# Patient Record
Sex: FEMALE | Race: WHITE | NOT HISPANIC OR LATINO | Employment: UNEMPLOYED | URBAN - METROPOLITAN AREA
[De-identification: names, ages, dates, MRNs, and addresses within clinical notes are randomized per-mention and may not be internally consistent; named-entity substitution may affect disease eponyms.]

---

## 2024-05-01 ENCOUNTER — HOSPITAL ENCOUNTER (OUTPATIENT)
Dept: RADIOLOGY | Facility: EXTERNAL LOCATION | Age: 64
Discharge: HOME | End: 2024-05-01

## 2024-05-01 ENCOUNTER — OFFICE VISIT (OUTPATIENT)
Dept: PRIMARY CARE | Facility: CLINIC | Age: 64
End: 2024-05-01
Payer: COMMERCIAL

## 2024-05-01 ENCOUNTER — LAB (OUTPATIENT)
Dept: LAB | Facility: LAB | Age: 64
End: 2024-05-01
Payer: COMMERCIAL

## 2024-05-01 VITALS
HEIGHT: 65 IN | BODY MASS INDEX: 31.69 KG/M2 | OXYGEN SATURATION: 98 % | DIASTOLIC BLOOD PRESSURE: 87 MMHG | HEART RATE: 73 BPM | WEIGHT: 190.2 LBS | TEMPERATURE: 97.2 F | SYSTOLIC BLOOD PRESSURE: 118 MMHG

## 2024-05-01 DIAGNOSIS — M54.6 CHRONIC LEFT-SIDED THORACIC BACK PAIN: ICD-10-CM

## 2024-05-01 DIAGNOSIS — L40.50 PSORIATIC ARTHRITIS (MULTI): ICD-10-CM

## 2024-05-01 DIAGNOSIS — D64.9 ANEMIA, UNSPECIFIED TYPE: ICD-10-CM

## 2024-05-01 DIAGNOSIS — M25.561 CHRONIC PAIN OF RIGHT KNEE: ICD-10-CM

## 2024-05-01 DIAGNOSIS — L40.50 PSORIATIC ARTHROPATHY (MULTI): ICD-10-CM

## 2024-05-01 DIAGNOSIS — Z11.59 ENCOUNTER FOR HEPATITIS C SCREENING TEST FOR LOW RISK PATIENT: ICD-10-CM

## 2024-05-01 DIAGNOSIS — B35.1 FUNGAL TOENAIL INFECTION: ICD-10-CM

## 2024-05-01 DIAGNOSIS — Z00.00 ANNUAL PHYSICAL EXAM: ICD-10-CM

## 2024-05-01 DIAGNOSIS — L65.9 HAIR LOSS: Primary | ICD-10-CM

## 2024-05-01 DIAGNOSIS — G89.29 CHRONIC PAIN OF RIGHT KNEE: ICD-10-CM

## 2024-05-01 DIAGNOSIS — M25.551 PAIN OF RIGHT HIP: ICD-10-CM

## 2024-05-01 DIAGNOSIS — Z12.31 BREAST CANCER SCREENING BY MAMMOGRAM: ICD-10-CM

## 2024-05-01 DIAGNOSIS — G89.29 CHRONIC LEFT-SIDED THORACIC BACK PAIN: ICD-10-CM

## 2024-05-01 DIAGNOSIS — L65.9 HAIR LOSS: ICD-10-CM

## 2024-05-01 DIAGNOSIS — Z12.11 COLON CANCER SCREENING: ICD-10-CM

## 2024-05-01 LAB
25(OH)D3 SERPL-MCNC: 33 NG/ML (ref 30–100)
ALBUMIN SERPL BCP-MCNC: 4.5 G/DL (ref 3.4–5)
ALP SERPL-CCNC: 97 U/L (ref 33–136)
ALT SERPL W P-5'-P-CCNC: 16 U/L (ref 7–45)
ANION GAP SERPL CALC-SCNC: 14 MMOL/L (ref 10–20)
AST SERPL W P-5'-P-CCNC: 18 U/L (ref 9–39)
BASOPHILS # BLD AUTO: 0.05 X10*3/UL (ref 0–0.1)
BASOPHILS NFR BLD AUTO: 0.5 %
BILIRUB SERPL-MCNC: 0.6 MG/DL (ref 0–1.2)
BUN SERPL-MCNC: 12 MG/DL (ref 6–23)
CALCIUM SERPL-MCNC: 9.5 MG/DL (ref 8.6–10.6)
CCP IGG SERPL-ACNC: <1 U/ML
CHLORIDE SERPL-SCNC: 104 MMOL/L (ref 98–107)
CHOLEST SERPL-MCNC: 214 MG/DL (ref 0–199)
CHOLESTEROL/HDL RATIO: 3.8
CO2 SERPL-SCNC: 28 MMOL/L (ref 21–32)
CREAT SERPL-MCNC: 0.72 MG/DL (ref 0.5–1.05)
CRP SERPL-MCNC: 0.86 MG/DL
EGFRCR SERPLBLD CKD-EPI 2021: >90 ML/MIN/1.73M*2
EOSINOPHIL # BLD AUTO: 0.2 X10*3/UL (ref 0–0.7)
EOSINOPHIL NFR BLD AUTO: 2.2 %
ERYTHROCYTE [DISTWIDTH] IN BLOOD BY AUTOMATED COUNT: 12.8 % (ref 11.5–14.5)
ERYTHROCYTE [SEDIMENTATION RATE] IN BLOOD BY WESTERGREN METHOD: 22 MM/H (ref 0–30)
GLUCOSE SERPL-MCNC: 96 MG/DL (ref 74–99)
HCT VFR BLD AUTO: 42.2 % (ref 36–46)
HCV AB SER QL: NONREACTIVE
HDLC SERPL-MCNC: 56.3 MG/DL
HGB BLD-MCNC: 13.3 G/DL (ref 12–16)
IMM GRANULOCYTES # BLD AUTO: 0.02 X10*3/UL (ref 0–0.7)
IMM GRANULOCYTES NFR BLD AUTO: 0.2 % (ref 0–0.9)
LDLC SERPL CALC-MCNC: 133 MG/DL
LYMPHOCYTES # BLD AUTO: 3.22 X10*3/UL (ref 1.2–4.8)
LYMPHOCYTES NFR BLD AUTO: 35.2 %
MCH RBC QN AUTO: 28.5 PG (ref 26–34)
MCHC RBC AUTO-ENTMCNC: 31.5 G/DL (ref 32–36)
MCV RBC AUTO: 91 FL (ref 80–100)
MONOCYTES # BLD AUTO: 0.51 X10*3/UL (ref 0.1–1)
MONOCYTES NFR BLD AUTO: 5.6 %
NEUTROPHILS # BLD AUTO: 5.14 X10*3/UL (ref 1.2–7.7)
NEUTROPHILS NFR BLD AUTO: 56.3 %
NON HDL CHOLESTEROL: 158 MG/DL (ref 0–149)
NRBC BLD-RTO: 0 /100 WBCS (ref 0–0)
PLATELET # BLD AUTO: 417 X10*3/UL (ref 150–450)
POTASSIUM SERPL-SCNC: 4.6 MMOL/L (ref 3.5–5.3)
PROT SERPL-MCNC: 7.3 G/DL (ref 6.4–8.2)
RBC # BLD AUTO: 4.66 X10*6/UL (ref 4–5.2)
RHEUMATOID FACT SER NEPH-ACNC: <10 IU/ML (ref 0–15)
SODIUM SERPL-SCNC: 141 MMOL/L (ref 136–145)
TRIGL SERPL-MCNC: 123 MG/DL (ref 0–149)
TSH SERPL-ACNC: 3.47 MIU/L (ref 0.44–3.98)
VLDL: 25 MG/DL (ref 0–40)
WBC # BLD AUTO: 9.1 X10*3/UL (ref 4.4–11.3)

## 2024-05-01 PROCEDURE — 84443 ASSAY THYROID STIM HORMONE: CPT

## 2024-05-01 PROCEDURE — 36415 COLL VENOUS BLD VENIPUNCTURE: CPT

## 2024-05-01 PROCEDURE — 80061 LIPID PANEL: CPT

## 2024-05-01 PROCEDURE — 80053 COMPREHEN METABOLIC PANEL: CPT

## 2024-05-01 PROCEDURE — 86431 RHEUMATOID FACTOR QUANT: CPT

## 2024-05-01 PROCEDURE — 82306 VITAMIN D 25 HYDROXY: CPT

## 2024-05-01 PROCEDURE — 86803 HEPATITIS C AB TEST: CPT

## 2024-05-01 PROCEDURE — 99386 PREV VISIT NEW AGE 40-64: CPT | Performed by: NURSE PRACTITIONER

## 2024-05-01 PROCEDURE — 85025 COMPLETE CBC W/AUTO DIFF WBC: CPT

## 2024-05-01 PROCEDURE — 85652 RBC SED RATE AUTOMATED: CPT

## 2024-05-01 PROCEDURE — 86140 C-REACTIVE PROTEIN: CPT

## 2024-05-01 PROCEDURE — 86200 CCP ANTIBODY: CPT

## 2024-05-01 RX ORDER — CICLOPIROX 80 MG/ML
SOLUTION TOPICAL
COMMUNITY
Start: 2023-11-06 | End: 2024-05-01 | Stop reason: SDUPTHER

## 2024-05-01 RX ORDER — CICLOPIROX 80 MG/ML
SOLUTION TOPICAL
Qty: 6.6 ML | Refills: 1 | Status: SHIPPED | OUTPATIENT
Start: 2024-05-01

## 2024-05-01 RX ORDER — AMMONIUM LACTATE 12 G/100G
CREAM TOPICAL
COMMUNITY
Start: 2023-05-15 | End: 2024-05-01 | Stop reason: ALTCHOICE

## 2024-05-01 RX ORDER — NAPROXEN 250 MG/1
250 TABLET ORAL 2 TIMES DAILY PRN
COMMUNITY

## 2024-05-01 ASSESSMENT — ENCOUNTER SYMPTOMS
DYSPHORIC MOOD: 1
BACK PAIN: 1
ARTHRALGIAS: 1

## 2024-05-01 ASSESSMENT — PATIENT HEALTH QUESTIONNAIRE - PHQ9
SUM OF ALL RESPONSES TO PHQ9 QUESTIONS 1 AND 2: 0
1. LITTLE INTEREST OR PLEASURE IN DOING THINGS: NOT AT ALL
2. FEELING DOWN, DEPRESSED OR HOPELESS: NOT AT ALL

## 2024-05-01 NOTE — PROGRESS NOTES
"Yamilet Vera female   1960 63 y.o.   [id number]    Chief Complaint  New Patient Visit (C/O right hip pain X 6 months , swelling in right knee(normal for her she states) left shoulder pain only when leaning against \"something\").    History Of Present Illness  Yamilet Vera is a 63 y.o. female presents today to establish care.   No recent PCP.      Acute concerns today:   Has pain in back and tenderness when leaning back.  Present x 2-3 years.   States was evaluated by dermatologist in past and a punch bx was obtained in the general area of pain.  States she has not noted any growth, mass, or skin lesion in the area currently.        Pain is 3-4/10 only when leaning back on it.   Does not usually feel it when laying flat.  Only when leaning on that particular spot.          R hip and R knee pain- present x years.   Would like to have xray for further evaluation.       Chronic conditions reviewed:     Psoriatic arthritis   Followed w rheumatology in past.   Has not seen in many years.  Is skeptical that any intervention/medication will help with chronic pain but open to referral to Rheumatology.     Review of Systems  Review of Systems   Musculoskeletal:  Positive for arthralgias (r hip, r knee) and back pain.   Psychiatric/Behavioral:  Positive for dysphoric mood.        Diet: fairly well balanced (likes salty snacks)   Exercise: walks a mile daily   Dental: twice per year   Ophtho: Glasses, Yearly follow up.  Start of cataract      Screenings:   Pap- due   Mammo- ordered today   Colonoscopy- cologuard ordered   Immunizations:   Flu- uptd   Tdap-2019   Covid- uptd   Shingles-uptd     Past Medical History  She has a past medical history of Cancer (Multi) (2004) and Psoriatic arthritis (Multi).    Surgical History  She has no past surgical history on file.    Family History  Family History   Problem Relation Name Age of Onset    Heart disease Mother      Eczema Mother      Heart failure Mother      Skin cancer " "Mother      Other (Other) Father          Vascular    Alcohol abuse Father      Lung cancer Father          Social History  She reports that she has quit smoking. Her smoking use included cigarettes. She started smoking about 47 years ago. She has a 0.2 pack-year smoking history. She has never used smokeless tobacco. She reports current alcohol use of about 1.0 standard drink of alcohol per week. She reports that she does not use drugs.    DEPRESSION SCREEN  Over the past 2 weeks, how often have you been bothered by any of the following problems?  Little interest or pleasure in doing things: Not at all  Feeling down, depressed, or hopeless: Not at all    Allergies  Patient has no known allergies.    Medications  Current Outpatient Medications   Medication Instructions    ciclopirox (Penlac) 8 % solution TAKE 1 MAYA APPLIED TOPICALLY EVERY DAY    naproxen (NAPROSYN) 250 mg, oral, 2 times daily PRN        Objective   /87   Pulse 73   Temp 36.2 °C (97.2 °F) (Temporal)   Ht 1.651 m (5' 5\")   Wt 86.3 kg (190 lb 3.2 oz)   SpO2 98%   BMI 31.65 kg/m²    BMI: Estimated body mass index is 31.65 kg/m² as calculated from the following:    Height as of this encounter: 1.651 m (5' 5\").    Weight as of this encounter: 86.3 kg (190 lb 3.2 oz).    Physical Exam  Physical Exam  Vitals and nursing note reviewed.   Constitutional:       Appearance: Normal appearance.   HENT:      Head: Normocephalic and atraumatic.      Nose: Nose normal.      Mouth/Throat:      Mouth: Mucous membranes are moist.      Pharynx: Oropharynx is clear.   Eyes:      Extraocular Movements: Extraocular movements intact.      Conjunctiva/sclera: Conjunctivae normal.      Pupils: Pupils are equal, round, and reactive to light.   Cardiovascular:      Rate and Rhythm: Normal rate and regular rhythm.      Pulses: Normal pulses.      Heart sounds: Normal heart sounds.   Pulmonary:      Effort: Pulmonary effort is normal.      Breath sounds: Normal breath " sounds.   Abdominal:      General: Bowel sounds are normal.      Palpations: Abdomen is soft.      Tenderness: There is no abdominal tenderness.   Musculoskeletal:         General: Normal range of motion.      Cervical back: Neck supple.   Skin:     General: Skin is warm and dry.             Comments: Small round scar from prior bx site    Neurological:      General: No focal deficit present.      Mental Status: She is alert and oriented to person, place, and time. Mental status is at baseline.   Psychiatric:         Mood and Affect: Mood is anxious. Affect is flat.         Behavior: Behavior normal.         Thought Content: Thought content normal.         Judgment: Judgment normal.         Relevant Results and Imaging  No visits with results within 1 Year(s) from this visit.   Latest known visit with results is:   No results found for any previous visit.     No images are attached to the encounter.        Assessment and Plan  Assessment/Plan   Problem List Items Addressed This Visit             ICD-10-CM    Psoriatic arthropathy (Multi) L40.50     - Chronic  -Currently managed on naproxen as needed  - Refer to rheumatology 5/1/2024         Fungal toenail infection B35.1     -ongoing   -cont ciclopirox topical          Relevant Medications    ciclopirox (Penlac) 8 % solution     Other Visit Diagnoses         Codes    Hair loss    -  Primary L65.9    Relevant Orders    TSH with reflex to Free T4 if abnormal    Psoriatic arthritis (Multi)     L40.50    Relevant Orders    Referral to Rheumatology    Rheumatoid Factor    C-Reactive Protein    Citrulline Antibody, IgG    Sedimentation Rate    Annual physical exam     Z00.00    Relevant Orders    Vitamin D 25-Hydroxy,Total (for eval of Vitamin D levels)    Comprehensive Metabolic Panel    Lipid Panel    Anemia, unspecified type     D64.9    Relevant Orders    CBC and Auto Differential    Breast cancer screening by mammogram     Z12.31    Relevant Orders    BI mammo  bilateral screening tomosynthesis (Completed)    Colon cancer screening     Z12.11    Relevant Orders    Cologuard® colon cancer screening    Encounter for hepatitis C screening test for low risk patient     Z11.59    Relevant Orders    Hepatitis C antibody    Pain of right hip     M25.551    Relevant Orders    XR hip right with pelvis when performed 2 or 3 views    Chronic pain of right knee     M25.561, G89.29    Relevant Orders    XR knee right 3 views    Chronic left-sided thoracic back pain     M54.6, G89.29    Has pain in back and tenderness when leaning back.  Present x 2-3 years.     Relevant Orders    XR thoracic spine 3 views          HM  - Healthy habits  - Pap test-due-advised to make appointment for Pap  Mammogram-due ordered  Colon cancer [id number] ordered  - Continue routine dental visits at least twice a year  - Patient is up-to-date on all vaccines

## 2024-05-16 ENCOUNTER — HOSPITAL ENCOUNTER (OUTPATIENT)
Dept: RADIOLOGY | Facility: CLINIC | Age: 64
Discharge: HOME | End: 2024-05-16
Payer: COMMERCIAL

## 2024-05-16 DIAGNOSIS — G89.29 CHRONIC LEFT-SIDED THORACIC BACK PAIN: ICD-10-CM

## 2024-05-16 DIAGNOSIS — G89.29 CHRONIC PAIN OF RIGHT KNEE: ICD-10-CM

## 2024-05-16 DIAGNOSIS — M25.551 PAIN OF RIGHT HIP: ICD-10-CM

## 2024-05-16 DIAGNOSIS — M54.6 CHRONIC LEFT-SIDED THORACIC BACK PAIN: ICD-10-CM

## 2024-05-16 DIAGNOSIS — M25.561 CHRONIC PAIN OF RIGHT KNEE: ICD-10-CM

## 2024-05-16 PROCEDURE — 73562 X-RAY EXAM OF KNEE 3: CPT | Mod: RT

## 2024-05-16 PROCEDURE — 73562 X-RAY EXAM OF KNEE 3: CPT | Mod: RIGHT SIDE | Performed by: RADIOLOGY

## 2024-05-16 PROCEDURE — 72072 X-RAY EXAM THORAC SPINE 3VWS: CPT | Performed by: RADIOLOGY

## 2024-05-16 PROCEDURE — 72072 X-RAY EXAM THORAC SPINE 3VWS: CPT

## 2024-05-16 PROCEDURE — 73502 X-RAY EXAM HIP UNI 2-3 VIEWS: CPT | Mod: RT

## 2024-05-16 PROCEDURE — 73502 X-RAY EXAM HIP UNI 2-3 VIEWS: CPT | Mod: RIGHT SIDE | Performed by: RADIOLOGY

## 2024-05-19 DIAGNOSIS — M17.10 ARTHRITIS OF KNEE: Primary | ICD-10-CM

## 2024-05-30 LAB — NONINV COLON CA DNA+OCC BLD SCRN STL QL: NEGATIVE

## 2024-06-14 DIAGNOSIS — M25.551 PAIN OF RIGHT HIP: Primary | ICD-10-CM

## 2024-07-05 ENCOUNTER — EVALUATION (OUTPATIENT)
Dept: PHYSICAL THERAPY | Facility: CLINIC | Age: 64
End: 2024-07-05
Payer: COMMERCIAL

## 2024-07-05 DIAGNOSIS — M25.551 PAIN OF RIGHT HIP: ICD-10-CM

## 2024-07-05 PROCEDURE — 97110 THERAPEUTIC EXERCISES: CPT | Mod: GP

## 2024-07-05 PROCEDURE — 97161 PT EVAL LOW COMPLEX 20 MIN: CPT | Mod: GP

## 2024-07-05 ASSESSMENT — ENCOUNTER SYMPTOMS
LOSS OF SENSATION IN FEET: 0
DEPRESSION: 0
OCCASIONAL FEELINGS OF UNSTEADINESS: 0

## 2024-07-05 NOTE — Clinical Note
July 5, 2024    Gilberto Rudolph PT  [address] [place] [address]  [facility], [address] [address]    Patient: Yamilet Vera   YOB: 1960   Date of Visit: 7/5/2024       Dear MATT Griffin  [address] [address]    The attached plan of care is being sent to you because your patient’s medical reimbursement requires that you certify the plan of care. Your signature is required to allow uninterrupted insurance coverage.      You may indicate your approval by signing below and faxing this form back to us at Dept Fax: [phone].    Please call Dept: [phone] with any questions or concerns.    Thank you for this referral,        Gilberto Rudolph PT  [address] [place] [address]    Payer: Payor: [employer] / Plan: [employer] / Product Type: *No Product type* /                                                                         Date:     Dear Gilberto Rudolph PT,     Re: Ms. Yamilet Vera, MRN:[id number]    I certify that I have reviewed the attached plan of care and it is medically necessary for Ms. Yamilet Vera (1960) who is under my care.          ______________________________________                    _________________  Provider name and credentials                                           Date and time                                                                                           Plan of Care 7/5/24   Effective from: 7/5/2024  Effective to: 1/31/2025    Plan ID: [id number]            Participants as of Finalize on 7/5/2024    Name Type Comments Contact Info    MATT Griffin PCP - General  [phone]    Gilberto Rudolph PT Physical Therapist  [phone]       Last Plan Note     Author: Gilberto Rudolph PT Status: Incomplete Last edited: 7/5/2024 10:00 AM       Patient Name: Yamilet Vera  MRN: [id number]  Today's Date: 7/5/2024  Visit #1       Subjective:  Chief Complaint: Yamilet  "is a 63 y.o. F who presents to therapy c/o R lateral hip pain. Symptoms have been going on for \"twenty plus years\" but have recently progressed. She notes that she also has R knee pain that is more recent (within last 5 years). According to her PCP - hip imagining reveals \"mild arthritis.\" She denies any N/T, fever/chills, saddle anesthesia, gait disturbances, bowel/bladder changes, night pain.   Pain intensity at rest: 1/10 - rare  Pain intensity at worst: 4/10  Alleviating factors: Swimming, movement  Aggravating factors: Walking and stairs \"pain is always present\"  Occupation: Retired  Therapy Goals: Reduce pain    Objective:  Lumbar ROM  Flexion: No loss  Extension: 50% loss - mild hip pain  Side bend (L/R): 25% loss, B  Rotation (L/R): 25% loss    Repeated Motions    Flexion: No change  Extension: No change  Side bend (L/R): No change, No change    Hip ROM (L/R)  Flexion: 130°/120°  Abduction: 40°/40°  Extension: 15°/10°  External Rotation: 45°/40°  Internal rotation: 30°/20°    Knee ROM (L/R)  Ext: +2 / +10  Flex: 130 / 100    Specific Lower Extremity MMT (L/R)  Iliopsoas: 4-/5, 4-/5  Gluteus Jeremy (prone): 4/5, 4-/5  Hip External Rotation: 4/5, 4-/5  Gluteus Medius: 4/5, 3+/5  Gluteus Minimus: 4/5, 3+/5  Quadriceps 4+/5, 4/5    Special Tests  Stork Sign: Negative  Thigh Thrust: Negative  Scour: Positive on R  FADDIR: Positive on R      Joint mobility testing (normal unless otherwise noted below)  FAJ: Hypomobile in all directions on R  Lumbar CPA: hypomobile and painful L4-L5    Palpation: TTP R gluteals, TFL/ITB    Gait: Decreased R TKE throughout gait cycle    LEFS: 72    Treatment:          Therapeutic Exercises: Handout provided: Reviewed in Full  *Quadruped rocking  *Supine rotations  *Bridge w/ green band  *SL clamshell  *SL hip ABD    Education: Exercise progressions, exam findings, hip/lumbar/knee anatomy, answered all questions in full    Assessment:   Yamilet presents to therapy c/o R lateral " hip pain. Examination reveals limited R hip and knee ROM - likely a product of arthritic changes. Lateral hip pain may be a product of gluteal tendinopathy as a result of altered joint mechanics. She is likely to benefit from skilled interventions to address their impairments, and treatment that includes: manual techniques to decrease pain and improve joint/soft-tissue mobility, as well as exercises to increase strength, endurance, and neuromotor control.    Standardized testing and measures administered today reveal that the patient has multiple impairments in body structures and functions, activity limitations, and participation restrictions.  The patient has 0 personal factors and comorbidities that may serve as barriers affecting the plan of care. The patient's clinical presentation includes stable & uncomplicated characteristics as noted during today's evaluation, & these findings indicate that this patient is of low complexity.  Skilled PT services are warranted in order to realize measurable change in the above outcome measures and achieve improvements in the patient's functional status and individual goals.    Plan:   Planned interventions include: dry needling, education/instruction, manual therapy, neuromuscular re-education, self care/home management, therapeutic activities and therapeutic exercises.   Potential to achieve rehab goals: fair  Goals:   Demonstrate independence with home exercise program  Tolerate increased exercise without adverse reaction  Demonstrate improved posture & proper body mechanics throughout session  Increase R hip ROM to pain free + symmetrical  Increase R knee ROM to pain free + symmetrical  Increase R LE strength to pain free + WNL  Report decrease in pain by > 2 points to meet the MCID  Perform ADLs without an increase symptoms  Ascend / descend stairs without an increase in symptoms  Ambulate >1 mile without an increase in symptoms  Pt. will be able to sleep through the  night without an increase in symptoms             Current Participants as of 7/5/2024    Name Type Comments Contact Info    MATT Griffin PCP - General  [phone]    Signature pending    Gilberto Rudolph PT Physical Therapist  [phone]    Signature pending

## 2024-07-31 ENCOUNTER — APPOINTMENT (OUTPATIENT)
Dept: PHYSICAL THERAPY | Facility: CLINIC | Age: 64
End: 2024-07-31
Payer: COMMERCIAL

## 2024-08-07 ENCOUNTER — APPOINTMENT (OUTPATIENT)
Dept: PHYSICAL THERAPY | Facility: CLINIC | Age: 64
End: 2024-08-07
Payer: COMMERCIAL

## 2024-08-14 ENCOUNTER — APPOINTMENT (OUTPATIENT)
Dept: PHYSICAL THERAPY | Facility: CLINIC | Age: 64
End: 2024-08-14
Payer: COMMERCIAL

## 2024-08-19 ENCOUNTER — APPOINTMENT (OUTPATIENT)
Dept: RHEUMATOLOGY | Facility: CLINIC | Age: 64
End: 2024-08-19
Payer: COMMERCIAL

## 2025-03-06 NOTE — PROGRESS NOTES
"Patient Name: Yamilet Vera  MRN: [id number]  Today's Date: 7/5/2024  Visit #1  Time Calculation  Start Time: 1005  Stop Time: 1050  Time Calculation (min): 45 min    Subjective:  Chief Complaint: Yamilet is a 63 y.o. F who presents to therapy c/o R lateral hip pain. Symptoms have been going on for \"twenty plus years\" but have recently progressed. She notes that she also has R knee pain that is more recent (within last 5 years). According to her PCP - hip imaging reveals \"mild arthritis.\" She denies any N/T, fever/chills, saddle anesthesia, gait disturbances, bowel/bladder changes, night pain.   Pain intensity at rest: 1/10 - rare  Pain intensity at worst: 4/10  Alleviating factors: Swimming, movement  Aggravating factors: Walking and stairs \"pain is always present\"  Occupation: Retired  Therapy Goals: Reduce pain    Objective:  Lumbar ROM  Flexion: No loss  Extension: 50% loss - mild hip pain  Side bend (L/R): 25% loss, B  Rotation (L/R): 25% loss    Repeated Motions    Flexion: No change  Extension: No change  Side bend (L/R): No change, No change    Hip ROM (L/R)  Flexion: 130°/120°  Abduction: 40°/40°  Extension: 15°/10°  External Rotation: 45°/40°  Internal rotation: 30°/20°    Knee ROM (L/R)  Ext: +2 / +10  Flex: 130 / 100    Specific Lower Extremity MMT (L/R)  Iliopsoas: 4-/5, 4-/5  Gluteus Jeremy (prone): 4/5, 4-/5  Hip External Rotation: 4/5, 4-/5  Gluteus Medius: 4/5, 3+/5  Gluteus Minimus: 4/5, 3+/5  Quadriceps 4+/5, 4/5    Special Tests  Stork Sign: Negative  Thigh Thrust: Negative  Scour: Positive on R  FADDIR: Positive on R      Joint mobility testing (normal unless otherwise noted below)  FAJ: Hypomobile in all directions on R  Lumbar CPA: hypomobile and painful L4-L5    Palpation: TTP R gluteals, TFL/ITB    Gait: Decreased R TKE throughout gait cycle    LEFS: 72    Treatment:  PT Evaluation Time Entry  PT Evaluation (Low) Time Entry: 20  PT Therapeutic Procedures Time Entry  Therapeutic Exercise " Time Entry: 25    Therapeutic Exercises: Handout provided: Reviewed in Full  *Quadruped rocking  *Supine rotations  *Bridge w/ green band  *SL clamshell  *SL hip ABD    Education: Exercise progressions, exam findings, hip/lumbar/knee anatomy, answered all questions in full    Assessment:   Yamilet presents to therapy c/o R lateral hip pain. Examination reveals limited R hip and knee ROM - likely a product of arthritic changes. Lateral hip pain may be a product of gluteal tendinopathy as a result of altered joint mechanics. She is likely to benefit from skilled interventions to address their impairments, and treatment that includes: manual techniques to decrease pain and improve joint/soft-tissue mobility, as well as exercises to increase strength, endurance, and neuromotor control.    Standardized testing and measures administered today reveal that the patient has multiple impairments in body structures and functions, activity limitations, and participation restrictions.  The patient has 0 personal factors and comorbidities that may serve as barriers affecting the plan of care. The patient's clinical presentation includes stable & uncomplicated characteristics as noted during today's evaluation, & these findings indicate that this patient is of low complexity.  Skilled PT services are warranted in order to realize measurable change in the above outcome measures and achieve improvements in the patient's functional status and individual goals.    Plan:   Planned interventions include: dry needling, education/instruction, manual therapy, neuromuscular re-education, self care/home management, therapeutic activities and therapeutic exercises.   Potential to achieve rehab goals: fair  Goals:   Demonstrate independence with home exercise program  Tolerate increased exercise without adverse reaction  Demonstrate improved posture & proper body mechanics throughout session  Increase R hip ROM to pain free +  symmetrical  Increase R knee ROM to pain free + symmetrical  Increase R LE strength to pain free + WNL  Report decrease in pain by > 2 points to meet the MCID  Perform ADLs without an increase symptoms  Ascend / descend stairs without an increase in symptoms  Ambulate >1 mile without an increase in symptoms  Pt. will be able to sleep through the night without an increase in symptoms       No

## 2025-05-02 ENCOUNTER — APPOINTMENT (OUTPATIENT)
Dept: PRIMARY CARE | Facility: CLINIC | Age: 65
End: 2025-05-02
Payer: COMMERCIAL